# Patient Record
Sex: FEMALE | Race: WHITE | HISPANIC OR LATINO | Employment: UNEMPLOYED | URBAN - METROPOLITAN AREA
[De-identification: names, ages, dates, MRNs, and addresses within clinical notes are randomized per-mention and may not be internally consistent; named-entity substitution may affect disease eponyms.]

---

## 2019-09-19 ENCOUNTER — HISTORICAL (OUTPATIENT)
Dept: ADMINISTRATIVE | Facility: HOSPITAL | Age: 44
End: 2019-09-19

## 2022-04-30 NOTE — OP NOTE
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    OPERATION:    1. 14040 - Development of flap, right wrist.  2. 25115 - Extensor synovectomy, right wrist.  3. 25210 - Partial corpectomy, right scaphoid.  4. 25820 - Limited wrist arthrodesis with a mini hubcap plate, item [id number].  5. 51949 - C-arm fluoroscopy.  6. 64651 - Short-arm splint immobilization.    HISTORY:  This patient was involved in a work accident on September 19, 2017, while working as a  for [employer].  She severely injured her wrist and continued to have a lot of pain and discomfort with her wrist.  She had a wide range of conservative treatment which did not help.  She had a grasp of only 20 pounds of the right wrist versus 70 pounds in the left wrist with marked reduction of movement of the right wrist.  I recommended to her that we carry out a partial wrist fusion utilizing a round titanium hubcap plate and screws.  This should relieve a tremendous amount of her pain.  She still would be left after this with limited motion of the wrist, but it would be much better than a full wrist fusion.  It has been my experience that these partial wrist fusions usually last 15-20 years.  I talked to her in detail about the procedure.  We had a difficult time getting it approved, and finally had to go to the medical director of [place] after explaining the problem and then get it approved.  I discussed the surgery with Mrs. Hernandez through her daughter, who speaks excellent English as well as [place].  The risks and the possibility of complications were explained.    DESCRIPTION OF PROCEDURE:  The patient was prepped and draped in the usual manner.  She had an axillary block in the holding area, and then she was given very light general anesthesia.     A pneumatic tourniquet was  inflated to 280 mmHg after the arm had been exsanguinated.  An S-shaped incision was made on the dorsal aspect of the wrist.  The extensor tendon of the thumb was found and retracted radially, then all the extensor tendons of the fingers were finally retracted ulnarly.  There was a tremendous amount of inflammatory synovitis under and around the tendons that had to be removed.  I then made a V-shaped flap which was radially based, and lifted this flap up so that I could expose the carpal bones.  There was a lot of inflammation.  The lunate and the scaphoid were extremely loose.  I carried out a partial corpectomy of the scaphoid, removing the proximal aspect of the scaphoid, so that the partial wrist fusion would work and go into a good position.  I then selected the small hubcap round plate made by [employer], and following the protocol, I drilled 7 holes and put 7 screws, varied from 10 mm to 12 mm, through these holes.  All this was confirmed by fluoroscopy.  I then used cortical bone fibers, and I filled in the void in the middle and packed the bone fibers around the area of the partial wrist fusion as well.  These were soaked in antibiotic solution before they were put in.  The cortical fibers were made by the [employer], item #[id number], with the expiration of March 2022.  These were called amarilys-cortical fibers, medium.  The local tissue around it was then reconstructed with interrupted stitches, and finally, 4-0 subcuticular Monocryl with some 4-0 Vicryl were used at the skin.  Large compression dressing with a [employer] universal hand splint were applied.     Due to the extreme complexity of this case, it was necessary to use a surgical nurse 1st assistant throughout the procedure.  The nurse assisted, retracted, and carried out portions of the operation as necessary.        ______________________________  MD PARAM Trevino/KENNEY  DD:  09/19/2019  Time:  09:43AM  DT:   09/19/2019  Time:  10:21AM  Job #:  [id number]